# Patient Record
Sex: FEMALE | Race: WHITE | Employment: UNEMPLOYED | URBAN - METROPOLITAN AREA
[De-identification: names, ages, dates, MRNs, and addresses within clinical notes are randomized per-mention and may not be internally consistent; named-entity substitution may affect disease eponyms.]

---

## 2023-05-15 ENCOUNTER — HOSPITAL ENCOUNTER (EMERGENCY)
Facility: HOSPITAL | Age: 43
Discharge: HOME | End: 2023-05-15
Attending: EMERGENCY MEDICINE
Payer: COMMERCIAL

## 2023-05-15 ENCOUNTER — APPOINTMENT (OUTPATIENT)
Dept: CT IMAGING | Facility: HOSPITAL | Age: 43
End: 2023-05-15
Attending: EMERGENCY MEDICINE
Payer: COMMERCIAL

## 2023-05-15 VITALS
DIASTOLIC BLOOD PRESSURE: 82 MMHG | HEART RATE: 89 BPM | WEIGHT: 125 LBS | BODY MASS INDEX: 19.62 KG/M2 | OXYGEN SATURATION: 99 % | TEMPERATURE: 98 F | HEIGHT: 67 IN | SYSTOLIC BLOOD PRESSURE: 129 MMHG | RESPIRATION RATE: 20 BRPM

## 2023-05-15 DIAGNOSIS — K59.00 CONSTIPATION, UNSPECIFIED CONSTIPATION TYPE: Primary | ICD-10-CM

## 2023-05-15 DIAGNOSIS — R10.32 ABDOMINAL PAIN, LEFT LOWER QUADRANT: ICD-10-CM

## 2023-05-15 LAB
ALBUMIN SERPL-MCNC: 4.2 G/DL (ref 3.4–5)
ALBUMIN/GLOB SERPL: 1.2 {RATIO} (ref 1–2)
ALP LIVER SERPL-CCNC: 37 U/L
ALT SERPL-CCNC: 21 U/L
ANION GAP SERPL CALC-SCNC: 4 MMOL/L (ref 0–18)
AST SERPL-CCNC: 17 U/L (ref 15–37)
BASOPHILS # BLD AUTO: 0.01 X10(3) UL (ref 0–0.2)
BASOPHILS NFR BLD AUTO: 0.2 %
BILIRUB SERPL-MCNC: 0.6 MG/DL (ref 0.1–2)
BILIRUB UR QL: NEGATIVE
BUN BLD-MCNC: 16 MG/DL (ref 7–18)
BUN/CREAT SERPL: 22.2 (ref 10–20)
CALCIUM BLD-MCNC: 9 MG/DL (ref 8.5–10.1)
CHLORIDE SERPL-SCNC: 106 MMOL/L (ref 98–112)
CLARITY UR: CLEAR
CO2 SERPL-SCNC: 26 MMOL/L (ref 21–32)
COLOR UR: YELLOW
CREAT BLD-MCNC: 0.72 MG/DL
DEPRECATED RDW RBC AUTO: 42.5 FL (ref 35.1–46.3)
EOSINOPHIL # BLD AUTO: 0.05 X10(3) UL (ref 0–0.7)
EOSINOPHIL NFR BLD AUTO: 0.9 %
ERYTHROCYTE [DISTWIDTH] IN BLOOD BY AUTOMATED COUNT: 12.9 % (ref 11–15)
GFR SERPLBLD BASED ON 1.73 SQ M-ARVRAT: 105 ML/MIN/1.73M2 (ref 60–?)
GLOBULIN PLAS-MCNC: 3.5 G/DL (ref 2.8–4.4)
GLUCOSE BLD-MCNC: 99 MG/DL (ref 70–99)
GLUCOSE UR-MCNC: NORMAL MG/DL
HCT VFR BLD AUTO: 40.8 %
HGB BLD-MCNC: 13.4 G/DL
HGB UR QL STRIP.AUTO: NEGATIVE
IMM GRANULOCYTES # BLD AUTO: 0.01 X10(3) UL (ref 0–1)
IMM GRANULOCYTES NFR BLD: 0.2 %
KETONES UR-MCNC: 40 MG/DL
LEUKOCYTE ESTERASE UR QL STRIP.AUTO: NEGATIVE
LYMPHOCYTES # BLD AUTO: 2.04 X10(3) UL (ref 1–4)
LYMPHOCYTES NFR BLD AUTO: 34.9 %
MCH RBC QN AUTO: 29.4 PG (ref 26–34)
MCHC RBC AUTO-ENTMCNC: 32.8 G/DL (ref 31–37)
MCV RBC AUTO: 89.5 FL
MONOCYTES # BLD AUTO: 0.53 X10(3) UL (ref 0.1–1)
MONOCYTES NFR BLD AUTO: 9.1 %
NEUTROPHILS # BLD AUTO: 3.2 X10 (3) UL (ref 1.5–7.7)
NEUTROPHILS # BLD AUTO: 3.2 X10(3) UL (ref 1.5–7.7)
NEUTROPHILS NFR BLD AUTO: 54.7 %
NITRITE UR QL STRIP.AUTO: NEGATIVE
OSMOLALITY SERPL CALC.SUM OF ELEC: 283 MOSM/KG (ref 275–295)
PH UR: 5.5 [PH] (ref 5–8)
PLATELET # BLD AUTO: 258 10(3)UL (ref 150–450)
POTASSIUM SERPL-SCNC: 3.1 MMOL/L (ref 3.5–5.1)
PROT SERPL-MCNC: 7.7 G/DL (ref 6.4–8.2)
PROT UR-MCNC: NEGATIVE MG/DL
RBC # BLD AUTO: 4.56 X10(6)UL
SODIUM SERPL-SCNC: 136 MMOL/L (ref 136–145)
SP GR UR STRIP: 1.01 (ref 1–1.03)
UROBILINOGEN UR STRIP-ACNC: NORMAL
WBC # BLD AUTO: 5.8 X10(3) UL (ref 4–11)

## 2023-05-15 PROCEDURE — 99284 EMERGENCY DEPT VISIT MOD MDM: CPT

## 2023-05-15 PROCEDURE — 80053 COMPREHEN METABOLIC PANEL: CPT | Performed by: EMERGENCY MEDICINE

## 2023-05-15 PROCEDURE — 96360 HYDRATION IV INFUSION INIT: CPT

## 2023-05-15 PROCEDURE — 96361 HYDRATE IV INFUSION ADD-ON: CPT

## 2023-05-15 PROCEDURE — 85025 COMPLETE CBC W/AUTO DIFF WBC: CPT | Performed by: EMERGENCY MEDICINE

## 2023-05-15 PROCEDURE — 74177 CT ABD & PELVIS W/CONTRAST: CPT | Performed by: EMERGENCY MEDICINE

## 2023-05-15 RX ORDER — DICYCLOMINE HCL 20 MG
20 TABLET ORAL 4 TIMES DAILY PRN
Qty: 30 TABLET | Refills: 0 | Status: SHIPPED | OUTPATIENT
Start: 2023-05-15 | End: 2023-06-14

## 2023-05-15 RX ORDER — POLYETHYLENE GLYCOL 3350 17 G/17G
17 POWDER, FOR SOLUTION ORAL DAILY PRN
Qty: 12 EACH | Refills: 0 | Status: SHIPPED | OUTPATIENT
Start: 2023-05-15 | End: 2023-06-14

## 2023-05-15 NOTE — ED QUICK NOTES
Assisting primary RN. Discharge instructions including follow-up care and medications were reviewed and discussed with patient. Pt verbalized understanding to all information and all questions asked were answered at this time. Pt is AAOx4, calm, respirations noted as even and unlabored, skin warm and dry, and there are no signs or symptoms of distress noted at this time. Pt ambulatory with a steady gait to exit.

## 2023-05-15 NOTE — ED INITIAL ASSESSMENT (HPI)
Patient arrived from home, abd pain since 9am yesterday, describes as spasm in lower abd now radiating to left lower side, \"blood and mucus seen in stool about 2am\". Advil and tylenol taken at 10pm for pain, little to no relief.  +nausea, denies d/v

## 2023-07-25 ENCOUNTER — OFFICE VISIT (OUTPATIENT)
Dept: NEUROLOGY | Facility: CLINIC | Age: 43
End: 2023-07-25
Payer: COMMERCIAL

## 2023-07-25 ENCOUNTER — OFFICE VISIT (OUTPATIENT)
Dept: FAMILY MEDICINE CLINIC | Facility: CLINIC | Age: 43
End: 2023-07-25
Payer: COMMERCIAL

## 2023-07-25 ENCOUNTER — LAB ENCOUNTER (OUTPATIENT)
Dept: LAB | Facility: HOSPITAL | Age: 43
End: 2023-07-25
Attending: Other
Payer: COMMERCIAL

## 2023-07-25 VITALS — SYSTOLIC BLOOD PRESSURE: 104 MMHG | OXYGEN SATURATION: 100 % | HEART RATE: 79 BPM | DIASTOLIC BLOOD PRESSURE: 56 MMHG

## 2023-07-25 DIAGNOSIS — G43.831 INTRACTABLE MENSTRUAL MIGRAINE WITH STATUS MIGRAINOSUS: ICD-10-CM

## 2023-07-25 DIAGNOSIS — G43.111 INTRACTABLE MIGRAINE WITH AURA WITH STATUS MIGRAINOSUS: ICD-10-CM

## 2023-07-25 DIAGNOSIS — G43.011 INTRACTABLE MIGRAINE WITHOUT AURA AND WITH STATUS MIGRAINOSUS: ICD-10-CM

## 2023-07-25 DIAGNOSIS — Z23 NEED FOR HEPATITIS B VACCINATION: Primary | ICD-10-CM

## 2023-07-25 DIAGNOSIS — G43.111 INTRACTABLE MIGRAINE WITH AURA WITH STATUS MIGRAINOSUS: Primary | ICD-10-CM

## 2023-07-25 LAB
DEPRECATED HBV CORE AB SER IA-ACNC: 38.6 NG/ML
IRON SATN MFR SERPL: 40 %
IRON SERPL-MCNC: 113 UG/DL
TIBC SERPL-MCNC: 283 UG/DL (ref 240–450)
TRANSFERRIN SERPL-MCNC: 190 MG/DL (ref 200–360)

## 2023-07-25 PROCEDURE — 83540 ASSAY OF IRON: CPT | Performed by: OTHER

## 2023-07-25 PROCEDURE — 3074F SYST BP LT 130 MM HG: CPT | Performed by: OTHER

## 2023-07-25 PROCEDURE — 3078F DIAST BP <80 MM HG: CPT | Performed by: OTHER

## 2023-07-25 PROCEDURE — 90471 IMMUNIZATION ADMIN: CPT | Performed by: NURSE PRACTITIONER

## 2023-07-25 PROCEDURE — 99204 OFFICE O/P NEW MOD 45 MIN: CPT | Performed by: OTHER

## 2023-07-25 PROCEDURE — 84466 ASSAY OF TRANSFERRIN: CPT | Performed by: OTHER

## 2023-07-25 PROCEDURE — 82728 ASSAY OF FERRITIN: CPT | Performed by: OTHER

## 2023-07-25 PROCEDURE — 90746 HEPB VACCINE 3 DOSE ADULT IM: CPT | Performed by: NURSE PRACTITIONER

## 2023-07-25 RX ORDER — RIZATRIPTAN BENZOATE 10 MG/1
10 TABLET, ORALLY DISINTEGRATING ORAL AS NEEDED
COMMUNITY
End: 2023-07-25

## 2023-07-25 RX ORDER — RIZATRIPTAN BENZOATE 10 MG/1
TABLET ORAL
Qty: 12 TABLET | Refills: 0 | Status: SHIPPED | OUTPATIENT
Start: 2023-07-25

## 2023-07-25 RX ORDER — SEMAGLUTIDE 1.34 MG/ML
1 INJECTION, SOLUTION SUBCUTANEOUS WEEKLY
COMMUNITY
Start: 2022-09-01

## 2023-07-25 NOTE — PROGRESS NOTES
Patient presents for Hep B vaccination. Reports Hep B titers non reactive. Needs for school. Questionnaire complete and reviewed  VIS given  Administered to Left deltoid. Patient tolerated well. No reaction  Immunization form printed, stamped, signed and given to patient per request.   No further questions or concerns.

## 2023-07-25 NOTE — PROGRESS NOTES
[address]  [facility], [address] [place] [address]  [phone]              Date: July 25, 2023  Patient Name: Fabián Leon   MRN: [id number]    Reason for Evaluation: Headaches     HPI:     Fabián Leon is a 37year old woman with past medical history of migraine, thyroid cancer who presents for evaluation of migraine with aura. Rosi Jimenez is described as \"zigzag lines\" followed by headache. Migraines started at age 13, continued through her pregnancies and improved after delivery of her 3rd child. Usually would be pre-menstrual.  Never had aura prior to onset in December. This past December, developed aura which can affect either right lateral vision or midline, lasts about ~1 minute, then headache as stated above. Ophthalmology noted a normal exam and told the patient was related to migraine with aura. For the past 2 months, her migraines improved. But last Wednesday, developed blind spots and when she closed her eyes, the zig zag lines persisted. Occurred again on Saturday. She has had headache since Wednesday. Pain is located left frontal, non-pounding, cannot describe as dull/aching/throbbing, severe, worsened by routine movement, nausea, photophobia, phonophobia. Still does have migraine without aura. This headache started around the end of her cycle. She developed nosebleeds 1 month ago, occurred every 2-3 days. She checked with ENT but the epistaxis stopped. Rizatriptan helps but since she already took it twice last week, she took it late yesterday and her migraine slowly subsided. She used to only get 6 pills per year. Last MRI brain 2022 for right facial numbness x 2 months, normal results. OUTPATIENT MEDICATIONS  rizatriptan 10 MG Oral Tablet Dispersible, Take 1 tablet (10 mg total) by mouth as needed. , Disp: , Rfl:   semaglutide (OZEMPIC, 1 MG/DOSE,) 2 MG/1.5ML Subcutaneous Solution Pen-injector, Inject 1 mg into the skin once a week., Disp: , Rfl:   thyroid 120 MG Oral Tab, Take 1.5 tablets (180 mg total) by mouth daily. , Disp: , Rfl:     No current facility-administered medications on file prior to visit. MEDICAL HISTORY  Past Medical History:   Diagnosis Date    Diabetes insipidus ([address]     Thyroid ca [facility])        SURGICAL HISTORY  No past surgical history on file. SOCIAL HISTORY  Social History    Socioeconomic History      Marital status:       FAMILY HISTORY  No family history on file. ALLERGIES  No Known Allergies    REVIEW OF SYSTEMS:   13-point review of systems was done and is negative unless otherwise stated in HPI. PHYSICAL EXAM:   /56   Pulse 79   LMP 04/24/2023   SpO2 100%   General appearance: Well appearing, alert and in no acute distress  Skin: skin color normal.  No rashes or lesions. Head: Normocephalic, atraumatic. Neurological exam:    Mental Status:   Attention/Concentration: intact attention on bedside test   Fund of knowledge: intact  Speech: no dysarthria or aphasia     Cranial Nerves:  Pupils: OD 4 mm to 3 mm;  OS 4 mm to 3 mm   Visual Fields: R and L visual fields full to confrontation  CN III, CN IV, CN VI (Extraocular movements): intact. CN V:   intact sensation to light touch in all three divisions of the trigeminal nerves bilaterally. CN VII: normal facial muscle strength bilaterally  CN VIII: auditory acuity intact to bedside testing  CN IX/CN X: normal palate elevation  CN XI: normal movement of trapezius muscles bilaterally. CN XII: tongue midline, no atrophy or fasciculations. Motor Exam:   Muscle Bulk: No atrophy or fasciculations      Involuntary movements: none    Strength: 5/5 throughout     Sensory:   Light touch: intact in all 4 extremities.      Reflexes:    R  L  2+ throughout     Coordination:   Normal: Finger to nose: no ataxia or dysmetria     Gait:   Arises independently; normal posture; gait stable with normal stride length, rate, base and arm swing. LABS/DATA:  Component      Latest Ref Rng 5/15/2023   WBC      4.0 - 11.0 x10(3) uL 5.8    RBC      3.80 - 5.30 x10(6)uL 4.56    Hemoglobin      12.0 - 16.0 g/dL 13.4    Hematocrit      35.0 - 48.0 % 40.8    MCV      80.0 - 100.0 fL 89.5    MCH      26.0 - 34.0 pg 29.4    MCHC      31.0 - 37.0 g/dL 32.8    RDW-SD      35.1 - 46.3 fL 42.5    RDW      11.0 - 15.0 % 12.9    Platelet Count      513.8 - 450.0 10(3)uL 258.0    Prelim Neutrophil Abs      1.50 - 7.70 x10 (3) uL 3.20    Neutrophils Absolute      1.50 - 7.70 x10(3) uL 3.20    Lymphocytes Absolute      1.00 - 4.00 x10(3) uL 2.04    Monocytes Absolute      0.10 - 1.00 x10(3) uL 0.53    Eosinophils Absolute      0.00 - 0.70 x10(3) uL 0.05    Basophils Absolute      0.00 - 0.20 x10(3) uL 0.01    Immature Granulocyte Absolute      0.00 - 1.00 x10(3) uL 0.01    Neutrophils %      % 54.7    Lymphocytes %      % 34.9    Monocytes %      % 9.1    Eosinophils %      % 0.9    Basophils %      % 0.2    Immature Granulocyte %      % 0.2    Glucose      70 - 99 mg/dL 99    Sodium      136 - 145 mmol/L 136    Potassium      3.5 - 5.1 mmol/L 3.1 (L)    Chloride      98 - 112 mmol/L 106    Carbon Dioxide, Total      21.0 - 32.0 mmol/L 26.0    ANION GAP      0 - 18 mmol/L 4    BUN      7 - 18 mg/dL 16    CREATININE      0.55 - 1.02 mg/dL 0.72    BUN/CREATININE RATIO      10.0 - 20.0  22.2 (H)    CALCIUM      8.5 - 10.1 mg/dL 9.0    CALCULATED OSMOLALITY      275 - 295 mOsm/kg 283    eGFR-Cr      >=60 mL/min/1.73m2 105    ALT (SGPT)      13 - 56 U/L 21    AST (SGOT)      15 - 37 U/L 17    ALKALINE PHOSPHATASE      37 - 98 U/L 37    Total Bilirubin      0.1 - 2.0 mg/dL 0.6    PROTEIN, TOTAL      6.4 - 8.2 g/dL 7.7    Albumin      3.4 - 5.0 g/dL 4.2    Globulin      2.8 - 4.4 g/dL 3.5    A/G Ratio      1.0 - 2.0  1.2       Legend:  (L) Low  (H) High      IMAGING:  MRI brain 8/2022  IMPRESSION: Negative examination.  No interval change since prior study of 2015. ASSESSMENT:  The patient is a 37year old woman with past medical history of migraine, thyroid cancer who presents for evaluation of new migraine with aura. Has history of migraine without aura. Her neurological examination is non-focal.      Migraine with and without aura  Menstrual migraine   -Iron labs, supplement iron in diet during menstrual cycle   -No estrogen products due to migraine with aura   -Preventative: Hold due to infrequency   Future considerations: Propranolol, Amitriptyline, TPM   -Abortive: Rizatriptan at first sign of migraine pain or aura. Can combine with NSAID. Future considerations: Eletriptan, Almotriptan, Nurtec, Ubrelvy    Limit non-CGRP rescue medications <2 times per week to avoid developing medication overuse headaches   -Neuroimaging: MRI brain WO and MRV brain WO due to new aura   -Ophthalmology evaluation - done    -Follow up: 3 months  -Lifestyle information provided   -Patient will let my office know if she becomes pregnant or plan to become pregnant so we can adjust/stop medication safely       Discussed indication, administration, dose, and side effects with patient of any medications personally prescribed. Patient was advised to let my office know if they have any questions or concerns. Today, I personally spent 30 minutes in this case, including chart review, time spent with patient doing face to face evaluation w/ interview and exam and patient education, counseling, and time was spent in patient education, counseling, and coordination of care as described above. Issues discussed: Diagnosis and implications on future health, benefits and side effects of present and future medications, test results as well as further testing and medications required. This note was prepared using [employer] voice recognition dictation software and as a result, errors may occur. When identified, these errors have been corrected.  While every attempt is made to correct errors during dictation, discrepancies may still exist    Jluis Taylor DO   Staff Neurologist   7/25/2023  8:52 AM

## 2023-08-09 ENCOUNTER — HOSPITAL ENCOUNTER (OUTPATIENT)
Dept: MRI IMAGING | Age: 43
Discharge: HOME | End: 2023-08-09
Attending: Other
Payer: COMMERCIAL

## 2023-08-09 ENCOUNTER — PATIENT MESSAGE (OUTPATIENT)
Dept: NEUROLOGY | Facility: CLINIC | Age: 43
End: 2023-08-09

## 2023-08-09 DIAGNOSIS — G43.011 INTRACTABLE MIGRAINE WITHOUT AURA AND WITH STATUS MIGRAINOSUS: ICD-10-CM

## 2023-08-09 DIAGNOSIS — G43.111 INTRACTABLE MIGRAINE WITH AURA WITH STATUS MIGRAINOSUS: ICD-10-CM

## 2023-08-09 DIAGNOSIS — R93.0 ABNORMAL MRI OF HEAD: Primary | ICD-10-CM

## 2023-08-09 PROCEDURE — 70544 MR ANGIOGRAPHY HEAD W/O DYE: CPT | Performed by: OTHER

## 2023-08-09 PROCEDURE — 70551 MRI BRAIN STEM W/O DYE: CPT | Performed by: OTHER

## 2023-08-10 NOTE — TELEPHONE ENCOUNTER
From: Moises Eduardo  To: Hattie Marks DO  Sent: 8/9/2023 7:25 PM CDT  Subject: MRV & MRI results are in    Good evening:  MRV and MRI results are in. Please advise if any of the findings explain my aura migraines/headaches and vision problems. Do I need any follow ups/changes in treatment plan?       Many thanks,  Moises Eduardo

## 2023-08-17 ENCOUNTER — HOSPITAL ENCOUNTER (OUTPATIENT)
Dept: CT IMAGING | Facility: HOSPITAL | Age: 43
Discharge: HOME | End: 2023-08-17
Attending: Other
Payer: COMMERCIAL

## 2023-08-17 DIAGNOSIS — R93.0 ABNORMAL MRI OF HEAD: ICD-10-CM

## 2023-08-17 LAB
CREAT BLD-MCNC: 0.7 MG/DL
EGFRCR SERPLBLD CKD-EPI 2021: 109 ML/MIN/1.73M2 (ref 60–?)

## 2023-08-17 PROCEDURE — 70496 CT ANGIOGRAPHY HEAD: CPT | Performed by: OTHER

## 2023-08-17 PROCEDURE — 82565 ASSAY OF CREATININE: CPT

## 2023-08-30 ENCOUNTER — OFFICE VISIT (OUTPATIENT)
Dept: FAMILY MEDICINE CLINIC | Facility: CLINIC | Age: 43
End: 2023-08-30
Payer: COMMERCIAL

## 2023-08-30 DIAGNOSIS — Z23 NEED FOR VACCINATION: Primary | ICD-10-CM

## 2023-08-30 PROCEDURE — 90471 IMMUNIZATION ADMIN: CPT | Performed by: NURSE PRACTITIONER

## 2023-08-30 PROCEDURE — 90746 HEPB VACCINE 3 DOSE ADULT IM: CPT | Performed by: NURSE PRACTITIONER

## 2023-10-04 ENCOUNTER — IMMUNIZATION (OUTPATIENT)
Dept: FAMILY MEDICINE CLINIC | Facility: CLINIC | Age: 43
End: 2023-10-04
Payer: COMMERCIAL

## 2023-10-04 DIAGNOSIS — Z23 NEED FOR INFLUENZA VACCINATION: Primary | ICD-10-CM

## 2023-10-04 PROCEDURE — 90686 IIV4 VACC NO PRSV 0.5 ML IM: CPT | Performed by: NURSE PRACTITIONER

## 2023-10-04 PROCEDURE — 90471 IMMUNIZATION ADMIN: CPT | Performed by: NURSE PRACTITIONER

## 2023-10-26 ENCOUNTER — OFFICE VISIT (OUTPATIENT)
Dept: NEUROLOGY | Facility: CLINIC | Age: 43
End: 2023-10-26

## 2023-10-26 VITALS — HEIGHT: 67 IN | BODY MASS INDEX: 19.62 KG/M2 | WEIGHT: 125 LBS

## 2023-10-26 DIAGNOSIS — G43.011 INTRACTABLE MIGRAINE WITHOUT AURA AND WITH STATUS MIGRAINOSUS: ICD-10-CM

## 2023-10-26 DIAGNOSIS — G43.831 INTRACTABLE MENSTRUAL MIGRAINE WITH STATUS MIGRAINOSUS: ICD-10-CM

## 2023-10-26 DIAGNOSIS — G43.111 INTRACTABLE MIGRAINE WITH AURA WITH STATUS MIGRAINOSUS: ICD-10-CM

## 2023-10-26 DIAGNOSIS — H43.393 VITREOUS FLOATERS OF BOTH EYES: Primary | ICD-10-CM

## 2023-10-26 PROCEDURE — 99214 OFFICE O/P EST MOD 30 MIN: CPT | Performed by: OTHER

## 2023-10-26 PROCEDURE — 3008F BODY MASS INDEX DOCD: CPT | Performed by: OTHER

## 2023-10-26 RX ORDER — RIZATRIPTAN BENZOATE 10 MG/1
TABLET ORAL
Qty: 12 TABLET | Refills: 5 | Status: SHIPPED | OUTPATIENT
Start: 2023-10-26 | End: 2023-10-26

## 2023-10-26 RX ORDER — RIZATRIPTAN BENZOATE 10 MG/1
TABLET, ORALLY DISINTEGRATING ORAL
Qty: 12 TABLET | Refills: 5 | Status: SHIPPED | OUTPATIENT
Start: 2023-10-26

## 2024-01-28 ENCOUNTER — OFFICE VISIT (OUTPATIENT)
Dept: FAMILY MEDICINE CLINIC | Facility: CLINIC | Age: 44
End: 2024-01-28
Payer: COMMERCIAL

## 2024-01-28 DIAGNOSIS — Z23 NEED FOR HEPATITIS B VACCINATION: Primary | ICD-10-CM

## 2024-01-28 PROCEDURE — 90746 HEPB VACCINE 3 DOSE ADULT IM: CPT | Performed by: NURSE PRACTITIONER

## 2024-01-28 PROCEDURE — 90471 IMMUNIZATION ADMIN: CPT | Performed by: NURSE PRACTITIONER

## 2024-01-28 NOTE — PROGRESS NOTES
Vaccination Screening Questionnaire filled out by patient. Form was reviewed by health care provider.   No contraindications to vaccination.  Vaccination information sheet given.  Side effects and risks of vaccination explained and discussed.  Patient voiced understanding and agreed to proceed with vaccination.

## 2024-09-20 ENCOUNTER — APPOINTMENT (OUTPATIENT)
Dept: MAMMOGRAPHY | Age: 44
End: 2024-09-20

## 2024-09-20 DIAGNOSIS — Z12.31 ENCOUNTER FOR SCREENING MAMMOGRAM FOR MALIGNANT NEOPLASM OF BREAST: ICD-10-CM

## 2024-10-01 ENCOUNTER — IMMUNIZATION (OUTPATIENT)
Dept: LAB | Age: 44
End: 2024-10-01
Attending: EMERGENCY MEDICINE
Payer: COMMERCIAL

## 2024-10-01 DIAGNOSIS — Z23 NEED FOR VACCINATION: Primary | ICD-10-CM

## 2024-10-01 PROCEDURE — 90471 IMMUNIZATION ADMIN: CPT

## 2024-10-01 PROCEDURE — 90656 IIV3 VACC NO PRSV 0.5 ML IM: CPT
